# Patient Record
Sex: FEMALE | Race: WHITE
[De-identification: names, ages, dates, MRNs, and addresses within clinical notes are randomized per-mention and may not be internally consistent; named-entity substitution may affect disease eponyms.]

---

## 2017-11-21 ENCOUNTER — HOSPITAL ENCOUNTER (OUTPATIENT)
Dept: HOSPITAL 92 - SCSMAMMO | Age: 62
Discharge: HOME | End: 2017-11-21
Attending: FAMILY MEDICINE
Payer: COMMERCIAL

## 2017-11-21 DIAGNOSIS — Z12.31: Primary | ICD-10-CM

## 2017-11-21 PROCEDURE — G0202 SCR MAMMO BI INCL CAD: HCPCS

## 2017-11-21 PROCEDURE — 77067 SCR MAMMO BI INCL CAD: CPT

## 2017-11-21 NOTE — MMO
BILATERAL DIGITAL SCREENING MAMMOGRAMS:

 

HISTORY: 

This 62-year-old female presents for digital screening mammography.

 

COMPARISON: 

11/16/16, 11/6/15, 5/5/14.

 

This patient's mammogram is interpreted with the assistance of computer-aided detection.

 

Scattered areas of fibroglandular density are noted bilaterally.  No direct or indirect evidence of m
alignancy.  Stable typically benign calcifications in the right breast.  There is motion artifact on 
the left MLO view which needs to be repeated.

 

IMPRESSION: 

BI-RADS category 0, assessment is incomplete.  Needs additional imaging evaluation.  Repeat left MLO 
view is needed because of motion artifact.

 

 

POS: [facility]

## 2017-11-28 ENCOUNTER — HOSPITAL ENCOUNTER (OUTPATIENT)
Dept: HOSPITAL 92 - MAMMO | Age: 62
Discharge: HOME | End: 2017-11-28
Attending: FAMILY MEDICINE
Payer: COMMERCIAL

## 2017-11-28 DIAGNOSIS — R92.8: Primary | ICD-10-CM

## 2017-11-28 NOTE — MMO
DIAGNOSTIC LEFT MAMMOGRAM:

 

Date:  11/28/17 

 

Diagnostic exam is performed as recommended from the exam of 11/21/17. The left MLO view is repeated 
due to motion artifact on the screening exam. 

 

FINDINGS:

Repeat left MLO shows no significant motion artifact. There is no mass, distortion, or suspicious becky
cification. 

 

Recommend patient be returned to routine follow-up. 

 

IMPRESSION: 

 

BIRADS 1:  Negative

 

 

 

POS: [facility]

## 2018-06-26 ENCOUNTER — HOSPITAL ENCOUNTER (OUTPATIENT)
Dept: HOSPITAL 92 - CT | Age: 63
Discharge: HOME | End: 2018-06-26
Attending: UROLOGY
Payer: COMMERCIAL

## 2018-06-26 DIAGNOSIS — Z72.0: ICD-10-CM

## 2018-06-26 DIAGNOSIS — R31.9: Primary | ICD-10-CM

## 2018-06-26 LAB — ESTIMATED GFR-MDRD - POC: 56

## 2018-06-26 PROCEDURE — 74178 CT ABD&PLV WO CNTR FLWD CNTR: CPT

## 2018-06-26 PROCEDURE — 82565 ASSAY OF CREATININE: CPT

## 2018-06-26 NOTE — CT
ABDOMEN CT WITH AND WITHOUT CONTRAST

PELVIC CT WITH AND WITHOUT CONTRAST:

 

History: Microscopic hematuria. 

 

Technique: Abdomen and pelvic CT performed with and without IV contrast. Coronal reformatted images a
re submitted for interpretation. 

 

FINDINGS: 

 

ABDOMEN CT:

Lung bases are clear. Heart size is normal. No significant pericardial fluid. The descending thoracic
 aorta and abdominal aorta have an overall normal caliber. No periaortic fat stranding. 

 

Gallbladder is unremarkable. 

 

Intra and extrahepatic portal vein is patent. 

 

There is a cyst in the left hepatic lobe measuring 2.8 x 2.7 cm. The liver, spleen, pancreas, and adr
enal glands have appropriate enhancement. 

 

No hiatal hernia is noted. 

 

No gastrohepatic, retrocrural, or periportal lymphadenopathy. 

 

No mass, lymphadenopathy, free air or free fluid. 

 

Limited evaluation of the alimentary due to lack of oral contrast. No evidence of bowel obstruction. 
Ileocecal junction is normal. Minimal diverticulosis without evidence of diverticulitis. Normal calib
er appendix. 

 

Bilaterally, no hydronephrosis. No perinephric fat stranding. Symmetric enhancement of the kidneys. S
ymmetric excretion of the decompressed intrarenal collecting system. Bilateral ureters have a normal 
caliber. No hydroureter, or periureteral fat stranding. No filling defects in the opacified extrarena
l collecting systems. 

 

PELVIC CT:

No mass, lymphadenopathy, free air or free fluid. Contrast is noted in the dependent portion of the u
rinary bladder. Uterus is surgically absent. 

 

No lytic or blastic lesions of the osseous structures. 

 

IMPRESSION: 

1. No evidence of obstructive uropathy. Symmetric enhancement of the kidneys. 

2. Unremarkable urinary bladder. 

 

POS: [facility]

## 2019-01-18 ENCOUNTER — HOSPITAL ENCOUNTER (OUTPATIENT)
Dept: HOSPITAL 92 - BICCT | Age: 64
Discharge: HOME | End: 2019-01-18
Attending: FAMILY MEDICINE
Payer: COMMERCIAL

## 2019-01-18 DIAGNOSIS — R91.1: ICD-10-CM

## 2019-01-18 DIAGNOSIS — K76.89: Primary | ICD-10-CM

## 2019-01-18 LAB — ESTIMATED GFR-MDRD - POC: 50

## 2019-01-18 PROCEDURE — 74160 CT ABDOMEN W/CONTRAST: CPT

## 2019-01-18 PROCEDURE — 82565 ASSAY OF CREATININE: CPT

## 2019-01-18 NOTE — CT
ABDOMEN CT WITH CONTRAST:

 

Date:  01/18/19 

 

HISTORY:  

Evaluate hepatic cyst. 

 

COMPARISON:  

06/26/18. 

 

FINDINGS:

There is a 7.0 mm opacity in the middle lobe and a 1.0 cm opacity in the right lower lobe. Both lesio
ns are incompletely evaluated. Heart size is normal. No pericardial fluid. Portal vein is patent. Unr
emarkable gallbladder. Spleen, pancreas, and adrenal glands have appropriate enhancement. Symmetric e
nhancement of the kidneys. No obstructive uropathy. 

 

No gastrohepatic, retrocrural, or periportal lymphadenopathy. 

 

Redemonstration of a hypodensity in the left hepatic lobe measuring 2.2 x 2.5 cm (previously measurin
g 2.7 x 2.7 cm). Attenuation coefficient is 11 Hounsfield units, compatible with a cyst. Additional h
epatic lesions are not appreciated. 

 

No mesenteric mass, lymphadenopathy, free air, or free fluid. 

 

Visualized alimentary canal is unremarkable. No evidence of bowel obstruction. Visualized appendix ha
s a normal caliber. 

 

No lytic or blastic lesions in the osseous structures. 

 

IMPRESSION: 

1.  Redemonstration of a hepatic cyst. 

 

2.  Indeterminate and incompletely evaluated lesions in the right lung. Dedicated chest CT is recomme
nded. 

 

 

CODE T. 

 

 

POS: [facility]

## 2019-01-29 ENCOUNTER — HOSPITAL ENCOUNTER (OUTPATIENT)
Dept: HOSPITAL 92 - BICCT | Age: 64
Discharge: HOME | End: 2019-01-29
Attending: FAMILY MEDICINE
Payer: COMMERCIAL

## 2019-01-29 DIAGNOSIS — D71: ICD-10-CM

## 2019-01-29 DIAGNOSIS — K44.9: ICD-10-CM

## 2019-01-29 DIAGNOSIS — R91.8: Primary | ICD-10-CM

## 2019-01-29 PROCEDURE — 71260 CT THORAX DX C+: CPT

## 2019-01-29 NOTE — CT
CT THORAX WITH IV CONTRAST:

 

01/29/2019

 

HISTORY:

Lung nodule seen on recent CT scan examination.  CT thorax is recommended.

 

COMPARISON:

CT abdomen on 01/18/2019.

 

FINDINGS:

The previously described pulmonary nodule in the right middle lobe, which abuts the minor fissure, is
 again seen.  No additional noncalcified pulmonary nodule or mass is seen.

 

There is minimal dependent atelectasis.  A calcified granuloma is seen in the right middle lobe, as w
ell as in the lingula.

 

No pleural effusion is seen.

 

There are calcified mediastinal and hilar lymph nodes.  Non-specific nonenlarged lymph nodes are seen
 within the mediastinum.

 

Vascular calcifications are seen in the coronary arteries, as well as involving the thoracic aorta.  
The thoracic aorta is normal in caliber.

 

The fluid attenuation cystic lesion in the lateral segment, left hepatic lobe, is again seen, compati
ble with a cyst.

 

There has been interval development of a wedge-shaped area of diminished attenuation seen within the 
superior pole of the spleen, which was not seen on the recent study of 01/18/2019.  Findings may be r
elated to an area of splenic infarction.

 

The remainder of the visualized upper abdomen demonstrates a normal CT appearance.  A hiatal hernia i
s again present.

 

The osseous structures are intact.

 

IMPRESSION:

1.  Interval development of a wedge-shaped, low density area of absent enhancement within the superio
r pole of the spleen.  This may represent an area of splenic infarction.

 

2.  Small, pleural-based pulmonary nodule in the right middle lobe which does abut the minor fissure.
  Follow-up evaluation in six months is recommended.  No additional noncalcified pulmonary nodule is 
seen.

 

3.  Small hiatal hernia.

 

4.  Evidence of prior granulomatous disease.

 

5.  Above findings discussed with Dr. Cecily Echeverria on 1/30/19 at 9:52 hours.

 

 

POS: [facility]

## 2019-09-10 ENCOUNTER — HOSPITAL ENCOUNTER (OUTPATIENT)
Dept: HOSPITAL 92 - BICULT | Age: 64
Discharge: HOME | End: 2019-09-10
Attending: INTERNAL MEDICINE
Payer: COMMERCIAL

## 2019-09-10 DIAGNOSIS — I12.9: Primary | ICD-10-CM

## 2019-09-10 DIAGNOSIS — N18.3: ICD-10-CM

## 2019-09-10 PROCEDURE — 76770 US EXAM ABDO BACK WALL COMP: CPT

## 2019-09-10 NOTE — ULT
US Renal Bilateral STANDARD



History: Chronic kidney disease. Hypertension.



Comparison: CT abdomen January 18, 2019



Findings: Real-time grayscale and color evaluation of the kidneys and urinary bladder was performed.



Right kidney measures 10.1 x 4.6 x 4 cm and the left kidney measures 9.3 x 5.1 x 4.4 cm. Prevoid urin
lucho bladder volume is 120 mL.



No renal mass, hydronephrosis, or abnormal calcifications.



Impression: Normal renal ultrasound.



Reported By: Antoni Rodriguez 

Electronically Signed:  9/10/2019 10:36 AM

## 2019-11-14 ENCOUNTER — HOSPITAL ENCOUNTER (OUTPATIENT)
Dept: HOSPITAL 92 - BICCT | Age: 64
Discharge: HOME | End: 2019-11-14
Attending: FAMILY MEDICINE
Payer: COMMERCIAL

## 2019-11-14 DIAGNOSIS — R91.8: Primary | ICD-10-CM

## 2019-11-14 PROCEDURE — 71260 CT THORAX DX C+: CPT

## 2019-11-25 ENCOUNTER — HOSPITAL ENCOUNTER (OUTPATIENT)
Dept: HOSPITAL 92 - BICMAMMO | Age: 64
Discharge: HOME | End: 2019-11-25
Attending: FAMILY MEDICINE
Payer: COMMERCIAL

## 2019-11-25 DIAGNOSIS — Z12.31: Primary | ICD-10-CM

## 2019-11-25 PROCEDURE — 77067 SCR MAMMO BI INCL CAD: CPT

## 2019-11-25 PROCEDURE — 77063 BREAST TOMOSYNTHESIS BI: CPT

## 2020-09-02 ENCOUNTER — HOSPITAL ENCOUNTER (OUTPATIENT)
Dept: HOSPITAL 92 - BICCT | Age: 65
Discharge: HOME | End: 2020-09-02
Attending: FAMILY MEDICINE
Payer: MEDICARE

## 2020-09-02 DIAGNOSIS — F17.210: ICD-10-CM

## 2020-09-02 DIAGNOSIS — Z12.2: Primary | ICD-10-CM

## 2020-09-02 DIAGNOSIS — R91.1: ICD-10-CM

## 2020-09-02 PROCEDURE — G0297 LDCT FOR LUNG CA SCREEN: HCPCS

## 2020-09-02 NOTE — CT
EXAM: CT chest without contrast per low-dose cancer screening protocol



HISTORY: History of smoking and nicotine dependence



COMPARISON: None



TECHNIQUE: Multiple contiguous axial images were obtained in a CT of the chest without contrast per l
ow-dose cancer screening protocol. Sagittal and coronal reformats were performed.



FINDINGS: 

Pulmonary nodules: A calcified granuloma seen in the right middle lobe. There is a 6 mm noncalcified 
nodule in the superior aspect of the right middle lobe. No focal infiltrates are seen.

Pleural space: No pneumothorax or pleural effusion are seen. There is a focal area of pleural thicken
ing along the mediastinum in the lingula.



Heart: The heart is normal in size. Calcifications are seen in the coronary arteries and aorta.

Mediastinum: No hilar or mediastinal lymphadenopathy appreciated on this limited noncontrast examinat
ion. Calcified hilar and mediastinal lymph nodes are seen.



Bones: Degenerative changes in the spine.



Visualized subdiaphragmatic structures: 2.9 cm left hepatic cyst.



IMPRESSION:

Lung RADS category 3-probably benign. A follow-up low-dose CT is recommended in 6 months to follow th
e noncalcified right middle lobe nodule.



Reported By: Quinten Garcia 

Electronically Signed:  9/2/2020 9:47 AM

## 2021-02-24 ENCOUNTER — HOSPITAL ENCOUNTER (OUTPATIENT)
Dept: HOSPITAL 92 - BICMAMMO | Age: 66
Discharge: HOME | End: 2021-02-24
Attending: FAMILY MEDICINE
Payer: MEDICARE

## 2021-02-24 DIAGNOSIS — I25.10: ICD-10-CM

## 2021-02-24 DIAGNOSIS — Z12.2: ICD-10-CM

## 2021-02-24 DIAGNOSIS — K44.9: ICD-10-CM

## 2021-02-24 DIAGNOSIS — R91.1: ICD-10-CM

## 2021-02-24 DIAGNOSIS — F17.210: ICD-10-CM

## 2021-02-24 DIAGNOSIS — I70.90: ICD-10-CM

## 2021-02-24 DIAGNOSIS — J84.10: ICD-10-CM

## 2021-02-24 DIAGNOSIS — K76.89: ICD-10-CM

## 2021-02-24 DIAGNOSIS — Z12.31: Primary | ICD-10-CM

## 2021-02-24 PROCEDURE — 77063 BREAST TOMOSYNTHESIS BI: CPT

## 2021-02-24 PROCEDURE — 71271 CT THORAX LUNG CANCER SCR C-: CPT

## 2021-02-24 PROCEDURE — 77067 SCR MAMMO BI INCL CAD: CPT

## 2021-02-24 NOTE — CT
Exam: Noncontrast chest CT; CT lung scan low dose



HISTORY:Patient is currently a smoker. Less than 1 pack a day. Patient has smoked for 35 years. Nicot
ine dependence. 6 mm nodule noted on the previous screening CT.



COMPARISON: 1/29/2019, 9/2/2020.



TECHNIQUE: Low-dose screening lung CT is performed utilizing institutional protocol



FINDINGS:

Lung screening specific (Lung-RADS): 2 - benign appearance or behavior. There is a solid nodule in th
e middle lobe which measures 0.7 cm and is unchanged since the examination from 1/18/2019. There is

greater than two years of stability. No new or suspicious nodules in the left or right lung parenchym
a.



Potential significant incidentals (Lung-RADS category S): None

Pulmonary incidentals:Stable calcified granuloma in the right lung. Stable mild emphysematous changes
 and mild mosaic attenuation of the lung parenchyma.

Other incidentals: Stable cyst in the liver. Stable small hiatal hernia. Stable atherosclerosis and c
oronary artery disease. Stable calcified granulomas.



IMPRESSION:

1. Lung-RADS 2 - Benign appearance or behavior. Stable solid nodule in the middle lobe. There is grea
ter than two years of stability.

2. Lung-RADS category S: Negative. No new or unknown potential significant incidental findings requir
ing urgent additional evaluation



3. Other incidentals as above.



Recommendation: Continued routine annual low-dose lung screening CT. Follow-up in one year.



Transcribed Date/Time: 2/24/2021 1:06 PM



Reported By: Jefry Flanagan 

Electronically Signed:  2/24/2021 1:16 PM

## 2021-02-24 NOTE — MMO
Bilateral MAMMO Bilat Screen [facility].

 

CLINICAL HISTORY:

Patient is 65 years old and is seen for screening. The patient has no family

history of breast cancer.  The patient has no personal history of cancer.

 

VIEWS:

The views performed were:  bilateral craniocaudal with tomosynthesis and

bilateral mediolateral oblique with tomosynthesis.

 

FILMS COMPARED:

The present examination has been compared to prior imaging studies performed at

[facility] on 11/16/2016 and 11/21/2017, and at [facility] on

11/28/2017 and 11/25/2019.

 

This study has been interpreted with the assistance of computer-aided detection.

 

MAMMOGRAM FINDINGS:

There are scattered fibroglandular densities.

 

There are no suspicious masses, suspicious calcifications, or new areas of

architectural distortion.

 

IMPRESSION:

THERE IS NO MAMMOGRAPHIC EVIDENCE OF MALIGNANCY.

 

A ROUTINE FOLLOW-UP MAMMOGRAM IN 1 YEAR IS RECOMMENDED.

 

THE RESULTS OF THIS EXAM WERE SENT TO THE PATIENT.

 

ACR BI-RADS Category 1 - Negative

 

MAMMOGRAPHY NOTE:

 1. A negative mammogram report should not delay a biopsy if a dominant of

 clinically suspicious mass is present.

 2. Approximately 10% to 15% of breast cancers are not detected by

 mammography.

 3. Adenosis and dense breasts may obscure an underlying neoplasm.

 

 

Reported by: JAVIER TOVAR MD

Electonically Signed: [id number]

## 2022-08-18 ENCOUNTER — HOSPITAL ENCOUNTER (OUTPATIENT)
Dept: HOSPITAL 92 - BICCT | Age: 67
Discharge: HOME | End: 2022-08-18
Attending: FAMILY MEDICINE
Payer: MEDICARE

## 2022-08-18 DIAGNOSIS — Z12.2: ICD-10-CM

## 2022-08-18 DIAGNOSIS — Z12.31: Primary | ICD-10-CM

## 2022-08-18 DIAGNOSIS — M85.89: ICD-10-CM

## 2022-08-18 DIAGNOSIS — Z13.820: ICD-10-CM

## 2022-08-18 DIAGNOSIS — Z78.0: ICD-10-CM

## 2022-08-18 DIAGNOSIS — R91.8: ICD-10-CM

## 2022-08-18 DIAGNOSIS — K76.89: ICD-10-CM

## 2022-08-18 DIAGNOSIS — F17.210: ICD-10-CM

## 2022-08-18 DIAGNOSIS — K44.9: ICD-10-CM

## 2022-08-18 DIAGNOSIS — J98.11: ICD-10-CM

## 2022-08-18 PROCEDURE — 77067 SCR MAMMO BI INCL CAD: CPT

## 2022-08-18 PROCEDURE — 71271 CT THORAX LUNG CANCER SCR C-: CPT

## 2022-08-18 PROCEDURE — 77080 DXA BONE DENSITY AXIAL: CPT

## 2022-08-18 PROCEDURE — 77063 BREAST TOMOSYNTHESIS BI: CPT

## 2023-10-05 ENCOUNTER — HOSPITAL ENCOUNTER (OUTPATIENT)
Dept: HOSPITAL 92 - BICMAMMO | Age: 68
Discharge: HOME | End: 2023-10-05
Attending: FAMILY MEDICINE
Payer: MEDICARE

## 2023-10-05 DIAGNOSIS — Z12.31: Primary | ICD-10-CM

## 2023-10-05 DIAGNOSIS — Z12.2: ICD-10-CM

## 2023-10-05 DIAGNOSIS — Z72.0: ICD-10-CM

## 2023-10-05 PROCEDURE — 77067 SCR MAMMO BI INCL CAD: CPT

## 2023-10-05 PROCEDURE — 71271 CT THORAX LUNG CANCER SCR C-: CPT

## 2023-10-05 PROCEDURE — 77063 BREAST TOMOSYNTHESIS BI: CPT

## 2024-10-08 ENCOUNTER — HOSPITAL ENCOUNTER (OUTPATIENT)
Dept: HOSPITAL 92 - CSHMAMMO | Age: 69
Discharge: HOME | End: 2024-10-08
Attending: FAMILY MEDICINE
Payer: MEDICARE

## 2024-10-08 ENCOUNTER — HOSPITAL ENCOUNTER (OUTPATIENT)
Dept: HOSPITAL 92 - CSHCT | Age: 69
Discharge: HOME | End: 2024-10-08
Attending: FAMILY MEDICINE
Payer: MEDICARE

## 2024-10-08 DIAGNOSIS — Z78.0: ICD-10-CM

## 2024-10-08 DIAGNOSIS — F17.210: ICD-10-CM

## 2024-10-08 DIAGNOSIS — Z12.2: Primary | ICD-10-CM

## 2024-10-08 DIAGNOSIS — Z12.31: Primary | ICD-10-CM

## 2024-10-08 DIAGNOSIS — M85.89: ICD-10-CM

## 2024-10-08 DIAGNOSIS — Z80.3: ICD-10-CM

## 2024-10-08 PROCEDURE — 77067 SCR MAMMO BI INCL CAD: CPT

## 2024-10-08 PROCEDURE — 77080 DXA BONE DENSITY AXIAL: CPT

## 2024-10-08 PROCEDURE — 77063 BREAST TOMOSYNTHESIS BI: CPT

## 2024-10-08 PROCEDURE — 71271 CT THORAX LUNG CANCER SCR C-: CPT
